# Patient Record
Sex: FEMALE | Race: WHITE | NOT HISPANIC OR LATINO | Employment: OTHER | URBAN - METROPOLITAN AREA
[De-identification: names, ages, dates, MRNs, and addresses within clinical notes are randomized per-mention and may not be internally consistent; named-entity substitution may affect disease eponyms.]

---

## 2022-08-16 ENCOUNTER — HOSPITAL ENCOUNTER (OUTPATIENT)
Dept: GENERAL RADIOLOGY | Facility: CLINIC | Age: 87
Discharge: HOME | End: 2022-08-16
Attending: INTERNAL MEDICINE
Payer: MEDICARE

## 2022-08-16 DIAGNOSIS — R91.8 OTHER NONSPECIFIC ABNORMAL FINDING OF LUNG FIELD: ICD-10-CM

## 2022-08-16 DIAGNOSIS — G47.34 IDIOPATHIC SLEEP RELATED NONOBSTRUCTIVE ALVEOLAR HYPOVENTILATION: ICD-10-CM

## 2022-08-16 PROCEDURE — 71046 X-RAY EXAM CHEST 2 VIEWS: CPT

## 2022-08-16 PROCEDURE — 76000 FLUOROSCOPY <1 HR PHYS/QHP: CPT

## 2023-05-17 ENCOUNTER — LAB REQUISITION (OUTPATIENT)
Dept: LAB | Facility: CLINIC | Age: 88
End: 2023-05-17
Payer: MEDICARE

## 2023-05-17 DIAGNOSIS — R35.0 FREQUENCY OF MICTURITION: ICD-10-CM

## 2023-05-17 LAB
ALBUMIN UR-MCNC: 20 MG/DL
APPEARANCE UR: ABNORMAL
BACTERIA #/AREA URNS HPF: ABNORMAL /HPF
BILIRUB UR QL STRIP: NEGATIVE
COLOR UR AUTO: YELLOW
GLUCOSE UR STRIP-MCNC: NEGATIVE MG/DL
HGB UR QL STRIP: ABNORMAL
KETONES UR STRIP-MCNC: NEGATIVE MG/DL
LEUKOCYTE ESTERASE UR QL STRIP: ABNORMAL
MUCOUS THREADS #/AREA URNS LPF: PRESENT /LPF
NITRATE UR QL: NEGATIVE
PH UR STRIP: 7 [PH] (ref 5–7)
RBC URINE: 10 /HPF
SP GR UR STRIP: 1.01 (ref 1–1.03)
SQUAMOUS EPITHELIAL: 2 /HPF
UROBILINOGEN UR STRIP-MCNC: NORMAL MG/DL
WBC CLUMPS #/AREA URNS HPF: PRESENT /HPF
WBC URINE: >182 /HPF

## 2023-05-17 PROCEDURE — 81001 URINALYSIS AUTO W/SCOPE: CPT | Mod: ORL

## 2023-05-17 PROCEDURE — 87086 URINE CULTURE/COLONY COUNT: CPT | Mod: ORL

## 2023-05-19 LAB — BACTERIA UR CULT: NORMAL

## 2023-08-16 ENCOUNTER — LAB REQUISITION (OUTPATIENT)
Dept: LAB | Facility: CLINIC | Age: 88
End: 2023-08-16
Payer: MEDICARE

## 2023-08-16 DIAGNOSIS — R35.0 FREQUENCY OF MICTURITION: ICD-10-CM

## 2023-08-16 LAB
ALBUMIN UR-MCNC: 30 MG/DL
APPEARANCE UR: ABNORMAL
BACTERIA #/AREA URNS HPF: ABNORMAL /HPF
BILIRUB UR QL STRIP: NEGATIVE
COLOR UR AUTO: YELLOW
GLUCOSE UR STRIP-MCNC: NEGATIVE MG/DL
HGB UR QL STRIP: ABNORMAL
HYALINE CASTS: 3 /LPF
KETONES UR STRIP-MCNC: NEGATIVE MG/DL
LEUKOCYTE ESTERASE UR QL STRIP: ABNORMAL
MUCOUS THREADS #/AREA URNS LPF: PRESENT /LPF
NITRATE UR QL: NEGATIVE
PH UR STRIP: 6.5 [PH] (ref 5–7)
RBC URINE: 8 /HPF
SP GR UR STRIP: 1.01 (ref 1–1.03)
SQUAMOUS EPITHELIAL: 2 /HPF
UROBILINOGEN UR STRIP-MCNC: NORMAL MG/DL
WBC CLUMPS #/AREA URNS HPF: PRESENT /HPF
WBC URINE: 80 /HPF

## 2023-08-16 PROCEDURE — 87186 SC STD MICRODIL/AGAR DIL: CPT | Mod: ORL | Performed by: INTERNAL MEDICINE

## 2023-08-16 PROCEDURE — 81001 URINALYSIS AUTO W/SCOPE: CPT | Mod: ORL | Performed by: INTERNAL MEDICINE

## 2023-08-17 LAB — BACTERIA UR CULT: ABNORMAL

## 2024-01-11 ENCOUNTER — LAB REQUISITION (OUTPATIENT)
Dept: LAB | Facility: CLINIC | Age: 89
End: 2024-01-11
Payer: MEDICARE

## 2024-01-11 DIAGNOSIS — Z87.440 PERSONAL HISTORY OF URINARY (TRACT) INFECTIONS: ICD-10-CM

## 2024-01-11 DIAGNOSIS — R41.0 DISORIENTATION, UNSPECIFIED: ICD-10-CM

## 2024-01-11 DIAGNOSIS — R53.0 NEOPLASTIC (MALIGNANT) RELATED FATIGUE: ICD-10-CM

## 2024-01-11 LAB
ALBUMIN UR-MCNC: 30 MG/DL
APPEARANCE UR: ABNORMAL
BACTERIA #/AREA URNS HPF: ABNORMAL /HPF
BILIRUB UR QL STRIP: NEGATIVE
COLOR UR AUTO: YELLOW
GLUCOSE UR STRIP-MCNC: NEGATIVE MG/DL
HGB UR QL STRIP: ABNORMAL
KETONES UR STRIP-MCNC: NEGATIVE MG/DL
LEUKOCYTE ESTERASE UR QL STRIP: ABNORMAL
MUCOUS THREADS #/AREA URNS LPF: PRESENT /LPF
NITRATE UR QL: NEGATIVE
PH UR STRIP: 7 [PH] (ref 5–7)
RBC URINE: 15 /HPF
SP GR UR STRIP: 1.01 (ref 1–1.03)
SQUAMOUS EPITHELIAL: 1 /HPF
TRANSITIONAL EPI: 1 /HPF
UROBILINOGEN UR STRIP-MCNC: NORMAL MG/DL
WBC CLUMPS #/AREA URNS HPF: PRESENT /HPF
WBC URINE: >182 /HPF

## 2024-01-11 PROCEDURE — 87086 URINE CULTURE/COLONY COUNT: CPT | Mod: ORL | Performed by: INTERNAL MEDICINE

## 2024-01-11 PROCEDURE — 81001 URINALYSIS AUTO W/SCOPE: CPT | Mod: ORL | Performed by: INTERNAL MEDICINE

## 2024-01-12 LAB — BACTERIA UR CULT: NORMAL

## 2024-03-05 ENCOUNTER — LAB REQUISITION (OUTPATIENT)
Dept: LAB | Facility: CLINIC | Age: 89
End: 2024-03-05
Payer: MEDICARE

## 2024-03-05 LAB
ALBUMIN UR-MCNC: 30 MG/DL
APPEARANCE UR: ABNORMAL
BACTERIA #/AREA URNS HPF: ABNORMAL /HPF
BILIRUB UR QL STRIP: NEGATIVE
COLOR UR AUTO: YELLOW
GLUCOSE UR STRIP-MCNC: NEGATIVE MG/DL
HGB UR QL STRIP: ABNORMAL
KETONES UR STRIP-MCNC: NEGATIVE MG/DL
LEUKOCYTE ESTERASE UR QL STRIP: ABNORMAL
MUCOUS THREADS #/AREA URNS LPF: PRESENT /LPF
NITRATE UR QL: NEGATIVE
PH UR STRIP: 5.5 [PH] (ref 5–7)
RBC URINE: 5 /HPF
SP GR UR STRIP: 1.01 (ref 1–1.03)
SQUAMOUS EPITHELIAL: 3 /HPF
UROBILINOGEN UR STRIP-MCNC: NORMAL MG/DL
WBC CLUMPS #/AREA URNS HPF: PRESENT /HPF
WBC URINE: >182 /HPF

## 2024-03-05 PROCEDURE — 81001 URINALYSIS AUTO W/SCOPE: CPT | Mod: ORL | Performed by: INTERNAL MEDICINE

## 2024-03-05 PROCEDURE — 87086 URINE CULTURE/COLONY COUNT: CPT | Mod: ORL | Performed by: INTERNAL MEDICINE

## 2024-03-05 PROCEDURE — 87186 SC STD MICRODIL/AGAR DIL: CPT | Mod: ORL | Performed by: INTERNAL MEDICINE

## 2024-03-06 LAB
BACTERIA UR CULT: ABNORMAL
BACTERIA UR CULT: ABNORMAL

## 2024-11-27 ENCOUNTER — LAB REQUISITION (OUTPATIENT)
Dept: LAB | Facility: CLINIC | Age: 89
End: 2024-11-27
Payer: MEDICARE

## 2024-11-27 DIAGNOSIS — Z87.440 PERSONAL HISTORY OF URINARY (TRACT) INFECTIONS: ICD-10-CM

## 2024-11-27 DIAGNOSIS — R35.0 FREQUENCY OF MICTURITION: ICD-10-CM

## 2024-11-27 DIAGNOSIS — R41.0 DISORIENTATION, UNSPECIFIED: ICD-10-CM

## 2024-11-27 LAB
ALBUMIN UR-MCNC: 10 MG/DL
APPEARANCE UR: ABNORMAL
BILIRUB UR QL STRIP: NEGATIVE
COLOR UR AUTO: YELLOW
GLUCOSE UR STRIP-MCNC: NEGATIVE MG/DL
HGB UR QL STRIP: ABNORMAL
KETONES UR STRIP-MCNC: NEGATIVE MG/DL
LEUKOCYTE ESTERASE UR QL STRIP: ABNORMAL
MUCOUS THREADS #/AREA URNS LPF: PRESENT /LPF
NITRATE UR QL: NEGATIVE
PH UR STRIP: 7 [PH] (ref 5–7)
RBC URINE: 2 /HPF
SP GR UR STRIP: 1.01 (ref 1–1.03)
SQUAMOUS EPITHELIAL: 5 /HPF
TRANSITIONAL EPI: 1 /HPF
UROBILINOGEN UR STRIP-MCNC: NORMAL MG/DL
WBC CLUMPS #/AREA URNS HPF: PRESENT /HPF
WBC URINE: >182 /HPF

## 2024-11-27 PROCEDURE — 87086 URINE CULTURE/COLONY COUNT: CPT | Mod: ORL | Performed by: INTERNAL MEDICINE

## 2024-11-27 PROCEDURE — 87186 SC STD MICRODIL/AGAR DIL: CPT | Mod: ORL | Performed by: INTERNAL MEDICINE

## 2024-11-27 PROCEDURE — 81001 URINALYSIS AUTO W/SCOPE: CPT | Mod: ORL | Performed by: INTERNAL MEDICINE

## 2024-11-28 LAB
BACTERIA UR CULT: ABNORMAL
BACTERIA UR CULT: ABNORMAL

## 2024-11-29 LAB
BACTERIA UR CULT: ABNORMAL
BACTERIA UR CULT: ABNORMAL

## 2025-03-27 ENCOUNTER — LAB REQUISITION (OUTPATIENT)
Dept: LAB | Facility: CLINIC | Age: 89
End: 2025-03-27
Payer: COMMERCIAL

## 2025-03-27 DIAGNOSIS — N39.0 URINARY TRACT INFECTION, SITE NOT SPECIFIED: ICD-10-CM

## 2025-03-27 LAB
ALBUMIN UR-MCNC: 20 MG/DL
AMORPH CRY #/AREA URNS HPF: ABNORMAL /HPF
APPEARANCE UR: ABNORMAL
BACTERIA #/AREA URNS HPF: ABNORMAL /HPF
BILIRUB UR QL STRIP: NEGATIVE
COLOR UR AUTO: ABNORMAL
GLUCOSE UR STRIP-MCNC: NEGATIVE MG/DL
HGB UR QL STRIP: ABNORMAL
KETONES UR STRIP-MCNC: NEGATIVE MG/DL
LEUKOCYTE ESTERASE UR QL STRIP: ABNORMAL
MUCOUS THREADS #/AREA URNS LPF: PRESENT /LPF
NITRATE UR QL: NEGATIVE
PH UR STRIP: 7.5 [PH] (ref 5–7)
RBC URINE: 1 /HPF
SP GR UR STRIP: 1.01 (ref 1–1.03)
SQUAMOUS EPITHELIAL: 3 /HPF
TRANSITIONAL EPI: <1 /HPF
UROBILINOGEN UR STRIP-MCNC: NORMAL MG/DL
WBC CLUMPS #/AREA URNS HPF: PRESENT /HPF
WBC URINE: 101 /HPF

## 2025-03-27 PROCEDURE — 87086 URINE CULTURE/COLONY COUNT: CPT | Mod: ORL | Performed by: INTERNAL MEDICINE

## 2025-03-27 PROCEDURE — 87186 SC STD MICRODIL/AGAR DIL: CPT | Mod: ORL | Performed by: INTERNAL MEDICINE

## 2025-03-27 PROCEDURE — 81001 URINALYSIS AUTO W/SCOPE: CPT | Mod: ORL | Performed by: INTERNAL MEDICINE

## 2025-03-28 LAB — BACTERIA UR CULT: ABNORMAL

## 2025-04-11 ENCOUNTER — LAB REQUISITION (OUTPATIENT)
Dept: LAB | Facility: CLINIC | Age: 89
End: 2025-04-11
Payer: MEDICARE

## 2025-04-11 DIAGNOSIS — R35.0 FREQUENCY OF MICTURITION: ICD-10-CM

## 2025-04-11 LAB
ALBUMIN UR-MCNC: NEGATIVE MG/DL
APPEARANCE UR: ABNORMAL
BACTERIA #/AREA URNS HPF: ABNORMAL /HPF
BILIRUB UR QL STRIP: NEGATIVE
COLOR UR AUTO: YELLOW
GLUCOSE UR STRIP-MCNC: NEGATIVE MG/DL
HGB UR QL STRIP: ABNORMAL
KETONES UR STRIP-MCNC: NEGATIVE MG/DL
LEUKOCYTE ESTERASE UR QL STRIP: ABNORMAL
MUCOUS THREADS #/AREA URNS LPF: PRESENT /LPF
NITRATE UR QL: POSITIVE
PH UR STRIP: 7.5 [PH] (ref 5–7)
RBC URINE: 3 /HPF
SP GR UR STRIP: 1.01 (ref 1–1.03)
SQUAMOUS EPITHELIAL: 2 /HPF
TRANSITIONAL EPI: <1 /HPF
UROBILINOGEN UR STRIP-MCNC: NORMAL MG/DL
WBC URINE: >182 /HPF

## 2025-04-11 PROCEDURE — 87186 SC STD MICRODIL/AGAR DIL: CPT | Mod: ORL | Performed by: INTERNAL MEDICINE

## 2025-04-11 PROCEDURE — 81001 URINALYSIS AUTO W/SCOPE: CPT | Mod: ORL | Performed by: INTERNAL MEDICINE

## 2025-04-13 LAB — BACTERIA UR CULT: ABNORMAL

## 2025-05-09 ENCOUNTER — HOSPITAL ENCOUNTER (EMERGENCY)
Facility: CLINIC | Age: 89
Discharge: HOME | End: 2025-05-09
Payer: MEDICARE

## 2025-05-09 ENCOUNTER — APPOINTMENT (OUTPATIENT)
Dept: ULTRASOUND IMAGING | Facility: CLINIC | Age: 89
End: 2025-05-09
Payer: MEDICARE

## 2025-05-09 VITALS
OXYGEN SATURATION: 98 % | DIASTOLIC BLOOD PRESSURE: 72 MMHG | WEIGHT: 140 LBS | HEART RATE: 87 BPM | TEMPERATURE: 98.7 F | SYSTOLIC BLOOD PRESSURE: 116 MMHG | RESPIRATION RATE: 18 BRPM

## 2025-05-09 DIAGNOSIS — M71.21 BAKER'S CYST OF KNEE, RIGHT: ICD-10-CM

## 2025-05-09 LAB
ANION GAP SERPL CALCULATED.3IONS-SCNC: 9 MMOL/L (ref 7–15)
BASOPHILS # BLD AUTO: 0 10E3/UL (ref 0–0.2)
BASOPHILS NFR BLD AUTO: 1 %
BUN SERPL-MCNC: 12.1 MG/DL (ref 8–23)
CALCIUM SERPL-MCNC: 9.2 MG/DL (ref 8.8–10.4)
CHLORIDE SERPL-SCNC: 97 MMOL/L (ref 98–107)
CREAT SERPL-MCNC: 0.73 MG/DL (ref 0.51–0.95)
EGFRCR SERPLBLD CKD-EPI 2021: 79 ML/MIN/1.73M2
EOSINOPHIL # BLD AUTO: 0.1 10E3/UL (ref 0–0.7)
EOSINOPHIL NFR BLD AUTO: 2 %
ERYTHROCYTE [DISTWIDTH] IN BLOOD BY AUTOMATED COUNT: 14.4 % (ref 10–15)
GLUCOSE SERPL-MCNC: 102 MG/DL (ref 70–99)
HCO3 SERPL-SCNC: 34 MMOL/L (ref 22–29)
HCT VFR BLD AUTO: 34.8 % (ref 35–47)
HGB BLD-MCNC: 11 G/DL (ref 11.7–15.7)
HOLD SPECIMEN: NORMAL
HOLD SPECIMEN: NORMAL
IMM GRANULOCYTES # BLD: 0.1 10E3/UL
IMM GRANULOCYTES NFR BLD: 1 %
LYMPHOCYTES # BLD AUTO: 1 10E3/UL (ref 0.8–5.3)
LYMPHOCYTES NFR BLD AUTO: 18 %
MCH RBC QN AUTO: 26.4 PG (ref 26.5–33)
MCHC RBC AUTO-ENTMCNC: 31.6 G/DL (ref 31.5–36.5)
MCV RBC AUTO: 84 FL (ref 78–100)
MONOCYTES # BLD AUTO: 0.5 10E3/UL (ref 0–1.3)
MONOCYTES NFR BLD AUTO: 8 %
NEUTROPHILS # BLD AUTO: 4.2 10E3/UL (ref 1.6–8.3)
NEUTROPHILS NFR BLD AUTO: 71 %
NRBC # BLD AUTO: 0 10E3/UL
NRBC BLD AUTO-RTO: 0 /100
PLATELET # BLD AUTO: 328 10E3/UL (ref 150–450)
POTASSIUM SERPL-SCNC: 3.7 MMOL/L (ref 3.4–5.3)
RBC # BLD AUTO: 4.16 10E6/UL (ref 3.8–5.2)
SODIUM SERPL-SCNC: 140 MMOL/L (ref 135–145)
WBC # BLD AUTO: 6 10E3/UL (ref 4–11)

## 2025-05-09 PROCEDURE — 80048 BASIC METABOLIC PNL TOTAL CA: CPT

## 2025-05-09 PROCEDURE — 85025 COMPLETE CBC W/AUTO DIFF WBC: CPT | Performed by: EMERGENCY MEDICINE

## 2025-05-09 PROCEDURE — 36415 COLL VENOUS BLD VENIPUNCTURE: CPT | Performed by: EMERGENCY MEDICINE

## 2025-05-09 PROCEDURE — 99284 EMERGENCY DEPT VISIT MOD MDM: CPT | Mod: 25

## 2025-05-09 PROCEDURE — 93971 EXTREMITY STUDY: CPT | Mod: RT

## 2025-05-09 PROCEDURE — 82565 ASSAY OF CREATININE: CPT | Performed by: EMERGENCY MEDICINE

## 2025-05-09 PROCEDURE — 85025 COMPLETE CBC W/AUTO DIFF WBC: CPT

## 2025-05-09 ASSESSMENT — ACTIVITIES OF DAILY LIVING (ADL)
ADLS_ACUITY_SCORE: 41

## 2025-05-09 ASSESSMENT — COLUMBIA-SUICIDE SEVERITY RATING SCALE - C-SSRS
6. HAVE YOU EVER DONE ANYTHING, STARTED TO DO ANYTHING, OR PREPARED TO DO ANYTHING TO END YOUR LIFE?: NO
1. IN THE PAST MONTH, HAVE YOU WISHED YOU WERE DEAD OR WISHED YOU COULD GO TO SLEEP AND NOT WAKE UP?: NO
2. HAVE YOU ACTUALLY HAD ANY THOUGHTS OF KILLING YOURSELF IN THE PAST MONTH?: NO

## 2025-05-09 NOTE — DISCHARGE INSTRUCTIONS
Keep right leg elevated    Use ACE wrap to right knee to help with compressing fluid    Ice the back of the right knee to help with pain, you are likely having pain from this, causing over-compensation to other muscles in the right calf and leg    Return for worsening swelling, inability to ambulate, chest pain or difficulty breathing

## 2025-05-09 NOTE — ED PROVIDER NOTES
History     Chief Complaint:  Leg Swelling       HPI   Marta Tavarez is a 89 year old female with a past medical history notable for hypertension, paroxysmal atrial fibrillation and lower extremity edema presenting today for evaluation of right calf pain and swelling, increased from baseline.  She is here with her friend who provides vast majority of the history.  Over the last 1.5 weeks, she has had a heaviness in the left lower extremity that has been progressively worsening to the point where she is dragging the right leg behind her.  The last 1 week, she cannot walk at her baseline.  Historically, her friend states that her left leg has been a problem, but over the last 1 week, the right leg has been so.  There has not been any known injury.  She denies any numbness or tingling.  When sitting, she reports little discomfort, but has a lot of pain with any ambulation.  No pain in the hip or low back.  She does not take blood thinners.    Independent Historian:    Friend, they report the above    Review of External Notes:  Office visit from 12/18/24 which outlines patient's chronic medical problems managed through the [facility] system with    Medications:    No current outpatient medications on file.      Past Medical History:    No past medical history on file.    Past Surgical History:    No past surgical history on file.       Physical Exam     Patient Vitals for the past 24 hrs:   BP Temp Temp src Pulse Resp SpO2 Weight   05/09/25 1108 -- -- -- -- -- 98 % --   05/09/25 1106 116/72 98.7  F (37.1  C) Oral 87 18 -- 63.5 kg (140 lb)        Physical Exam  /72   Pulse 87   Temp 98.7  F (37.1  C) (Oral)   Resp 18   Wt 63.5 kg (140 lb)   SpO2 98%    General: No acute distress. Accompanied by friend.  Head: Atraumatic.   EENT: Moist mucus membranes.   CV: Regular rate and rhythm.   Respiratory: Breathing comfortably on room air. Lungs clear to auscultation bilaterally without wheezes, rhonchi, or  rales.  GI: Soft, non-distended. Non-tender abdomen. No rebound, rigidity, or guarding.   Msk: Bilateral 1+ pitting edema with lower extremities in compression stockings.  There is tenderness with palpation of the medial aspect of the right calf without any overlying erythema.  There is no bony tenderness to palpation.  2+ DP and PT pulse on the right.  Skin: Warm and dry. No rashes.  Neuro: Awake, alert, and conversant. No focal neurologic deficits.       Emergency Department Course   Imaging:  US Lower Extremity Venous Duplex Right   Final Result   IMPRESSION:   1.  No deep venous thrombosis in the right lower extremity.   2.  Complex probable right Baker's cyst.        Laboratory:  Labs Ordered and Resulted from Time of ED Arrival to Time of ED Departure   BASIC METABOLIC PANEL - Abnormal       Result Value    Sodium 140      Potassium 3.7      Chloride 97 (*)     Carbon Dioxide (CO2) 34 (*)     Anion Gap 9      Urea Nitrogen 12.1      Creatinine 0.73      GFR Estimate 79      Calcium 9.2      Glucose 102 (*)    CBC WITH PLATELETS AND DIFFERENTIAL - Abnormal    WBC Count 6.0      RBC Count 4.16      Hemoglobin 11.0 (*)     Hematocrit 34.8 (*)     MCV 84      MCH 26.4 (*)     MCHC 31.6      RDW 14.4      Platelet Count 328      % Neutrophils 71      % Lymphocytes 18      % Monocytes 8      % Eosinophils 2      % Basophils 1      % Immature Granulocytes 1      NRBCs per 100 WBC 0      Absolute Neutrophils 4.2      Absolute Lymphocytes 1.0      Absolute Monocytes 0.5      Absolute Eosinophils 0.1      Absolute Basophils 0.0      Absolute Immature Granulocytes 0.1      Absolute NRBCs 0.0        Emergency Department Course & Assessments:  Interventions:  Medications - No data to display     Assessments:  Performed history and exam.    Independent Interpretation (X-rays, CTs, rhythm strip):  None    Consultations/Discussion of Management or Tests:  None    Social Drivers of Health affecting care:  Memory difficulties      Disposition:  The patient was discharged.    Impression & Plan    CMS Diagnoses: None       Medical Decision Makin year old-year-old female with [right/left calf pain/swelling] with history and exam consistent with low risk for deep vein thrombosis and likely [muscle strain].    Initial consideration in this patient included deep vein thrombosis (DVT), muscle strain, edema, cellulitis, arterial occlusions, baker's cyst, and pulmonary embolism (PE) among others.    Patient presented with complaint of right calf pain and swelling.  Formal] ultrasound obtained of the right with no evidence of clot and completely compressible vessels at visulaized portions of the common femoral, greater saphenous, superficial femoral, deep femoral, or popliteal veins.  We discussed limitations of bedside ultrasound evaluation, specifically limited ability to visualize the smaller vessels of the calf.  Patient felt to be low risk at this time with no indication for initiation of anticoagulation.  Discussed need for follow up within 1-2 weeks for repeat evaluation with ultrasound for persistent symptoms, and the patient demonstrated understanding and agreement.  No report of chest pain, shortness of breath, or other symptoms or findings suggestive of PE at this time. She was noted to have a complex Baker's cyst of the right calf, explaining the pain she has been experiencing, recommended compression, elevation and follow up with Orthopedics.     Prior to discharge, we discussed return precautions, specifically for evidence of chest pain or shortness of breath suggestive of PE.  There is nothing at this time to suggest compartment syndrome.  Will return for significant worsening of pain, swelling or bruising.      Diagnosis:    ICD-10-CM    1. Baker's cyst of knee, right  M71.21         Mine Arcos PA-C  2025   Mine Arcos PA-C Romano, Amanda, PA-C  [phone]

## 2025-05-23 ENCOUNTER — HOSPITAL ENCOUNTER (EMERGENCY)
Facility: CLINIC | Age: 89
Discharge: HOME | End: 2025-05-23
Attending: EMERGENCY MEDICINE | Admitting: EMERGENCY MEDICINE
Payer: MEDICARE

## 2025-05-23 VITALS
RESPIRATION RATE: 20 BRPM | DIASTOLIC BLOOD PRESSURE: 77 MMHG | OXYGEN SATURATION: 99 % | TEMPERATURE: 97.7 F | HEART RATE: 96 BPM | SYSTOLIC BLOOD PRESSURE: 128 MMHG

## 2025-05-23 DIAGNOSIS — N39.0 ACUTE UTI: ICD-10-CM

## 2025-05-23 DIAGNOSIS — K64.4 EXTERNAL HEMORRHOIDS WITH COMPLICATION: ICD-10-CM

## 2025-05-23 LAB
ALBUMIN UR-MCNC: NEGATIVE MG/DL
APPEARANCE UR: CLEAR
BACTERIA #/AREA URNS HPF: ABNORMAL /HPF
BILIRUB UR QL STRIP: NEGATIVE
COLOR UR AUTO: ABNORMAL
GLUCOSE UR STRIP-MCNC: NEGATIVE MG/DL
HGB UR QL STRIP: NEGATIVE
KETONES UR STRIP-MCNC: NEGATIVE MG/DL
LEUKOCYTE ESTERASE UR QL STRIP: ABNORMAL
MUCOUS THREADS #/AREA URNS LPF: PRESENT /LPF
NITRATE UR QL: NEGATIVE
PH UR STRIP: 8 [PH] (ref 5–7)
RBC URINE: 2 /HPF
SP GR UR STRIP: 1.01 (ref 1–1.03)
SQUAMOUS EPITHELIAL: 1 /HPF
UROBILINOGEN UR STRIP-MCNC: NORMAL MG/DL
WBC URINE: 23 /HPF

## 2025-05-23 PROCEDURE — 99283 EMERGENCY DEPT VISIT LOW MDM: CPT

## 2025-05-23 PROCEDURE — 87086 URINE CULTURE/COLONY COUNT: CPT | Performed by: EMERGENCY MEDICINE

## 2025-05-23 PROCEDURE — 81001 URINALYSIS AUTO W/SCOPE: CPT | Performed by: EMERGENCY MEDICINE

## 2025-05-23 PROCEDURE — 250N000013 HC RX MED GY IP 250 OP 250 PS 637: Performed by: EMERGENCY MEDICINE

## 2025-05-23 RX ORDER — CEPHALEXIN 500 MG/1
500 CAPSULE ORAL ONCE
Status: COMPLETED | OUTPATIENT
Start: 2025-05-23 | End: 2025-05-23

## 2025-05-23 RX ORDER — CEPHALEXIN 500 MG/1
500 CAPSULE ORAL 2 TIMES DAILY
Qty: 10 CAPSULE | Refills: 0 | Status: SHIPPED | OUTPATIENT
Start: 2025-05-23 | End: 2025-05-28

## 2025-05-23 RX ADMIN — CEPHALEXIN 500 MG: 500 CAPSULE ORAL at 17:51

## 2025-05-23 ASSESSMENT — ACTIVITIES OF DAILY LIVING (ADL)
ADLS_ACUITY_SCORE: 41

## 2025-05-23 NOTE — ED NOTES
"Writer assisted patient to the bedside commode.    Patient stated her friend will not be coming to pick her up to take her back home, stated, \"Kayla told me I have to go back home the way I got here\". Informed patient we will make sure that she has a ride to go back home, patient verbalized understanding, stated, \"I just want to go home\".   "

## 2025-05-23 NOTE — ED NOTES
After Visit Summary was reviewed with patient and friend, Kayla. Patient verbalized understanding of instructions, was recommended follow up and was given an opportunity to ask questions. Patient appears stable, ERT assisted with discharge/exit with wheelchair. Patient's friend, Kayla, will transport patient back to facility, facility aware. See note.

## 2025-05-23 NOTE — ED PROVIDER NOTES
Emergency Department Note      History of Present Illness     Chief Complaint   Hemorrhoids      HPI   Marta Tavarez is a 89 year old female with history of atrial fibrillation, CHF, and hypertension amongst others who presents to the ED for evaluation of hemorrhoids. Patient reports that she has hemorrhoids, and is usually able to push them back in, but was unable to reduce them on her own today. She state that she is currently in no pain. Patient denies any present bleeding. She adds that she has had hemorrhoids for a while now, and has never had them removed. Patient presents to the ED from [facility].  Independent Historian   None    Review of External Notes   none    Past Medical History     Medical History and Problem List   Centrilobular emphysema  Arthritis    Kyphoscoliosis   Edema  Chronic diastolic heart failure   Peptic ulcer disease   Diverticulitis of large intestine   Paroxysmal atrial fibrillation  Atrial tachycardia   Pancreatic cyst   Anemia   Osteoarthritis  Hypotension   Hyperlipidemia   Hypoxia   Hepatitis A   Hepatitis B   Lordoscoliosis  Incidental durotomy   Kyphoscoliosis  Cauda equina syndrome   Tachycardia   Thrombocytopenia   Gastric ulcer   Scoliosis  Lumbago   Vertigo   Gastrointestinal hemorrhage    Esophogeal reflux   Hypertension  Neurogenic bladder  Choledocholithiasis   Melena   Acute respiratory failure   Symptomatic menopausal state   Myalgia and myositis     Medications   Trelegy Ellipta inhaler   Bumex   Cardizem   Neurotonin   Diltiazem   Imodium     Surgical History   Cervical polypectomy   Arthroscopy bilateral   Hernia repair   Tonsil and adenoidectomy   Dilation and curettage - PMB, endocervical polyp   Stomach biopsy   Esophagogastroduodenoscopy   Arthroscopy  Anterior, posterior t-2to sacrum spinal surgery   IM kemal left hip   Arthroplasty right knee   Ercp w/ sphincterotomy and balloon dilation   Laparoscopic cholecystomy      Physical Exam     Patient Vitals for the  past 24 hrs:   BP Temp Temp src Pulse Resp SpO2   05/23/25 1640 -- -- -- -- -- 93 %   05/23/25 1603 -- -- -- -- -- 92 %   05/23/25 1545 97/58 97.7  F (36.5  C) Oral 77 20 --     Physical Exam  Constitutional:       Appearance: She is well-developed.   Cardiovascular:      Rate and Rhythm: Normal rate and regular rhythm.      Heart sounds: Normal heart sounds. No murmur heard.     No friction rub. No gallop.   Pulmonary:      Effort: Pulmonary effort is normal. No respiratory distress.      Breath sounds: Normal breath sounds. No wheezing or rales.   Abdominal:      General: Bowel sounds are normal. There is no distension.      Palpations: Abdomen is soft. There is no mass.      Tenderness: There is no abdominal tenderness.   Genitourinary:     Comments: 3 nontender nonthrombosed external hemorrhoids without any signs of bleeding seen.  No thrombosed external hemorrhoids on exam.  Musculoskeletal:      Right lower leg: No edema.      Left lower leg: No edema.   Skin:     General: Skin is warm and dry.      Capillary Refill: Capillary refill takes less than 2 seconds.      Findings: No rash.   Neurological:      Mental Status: She is alert.           Diagnostics     Lab Results   None     Imaging   None     Independent Interpretation   None    ED Course      Medications Administered   Medications - No data to display    Procedures   Procedures     Discussion of Management   None    ED Course   ED Course as of 05/23/25 1700   Fri May 23, 2025   1602 I obtained history and examined the patient as noted above.        Additional Documentation  None    Medical Decision Making / Diagnosis     CMS Diagnoses: None    MIPS   None           [facility]   Marta Tavarez is a 89 year old female who presents for evaluation of nonthrombosed external hemorrhoids.  On evaluation, there are 3 hemorrhoids that are same but they are no signs of bleeding or irritation.  They are nonthrombosed.  They are not tender on my exam.  I discussed with  the patient that they will prolapse even if we try to push them in.  We discussed symptoms to look for such as bleeding or severe pain or difficulty having a bowel movement.  There is nothing for us to do here in the ER.  She is referred to colorectal surgery to have them removed if she wants to have them done.  Referral has been placed in [facility] as well.  Initially patient wanted to have her friend take her back.  Unfortunately her friend is unable to do that as we had to send her back by wheelchair van.  Her friend will help her obtain colorectal surgery clinic appointments.  Discharge instructions given to her friend as well.  Antibiotic sent to her pharmacy for her urinary infection.  First dose antibiotic given here today.    Disposition   The patient was discharged.     Diagnosis     ICD-10-CM    1. External hemorrhoids with complication  K64.4 Adult Colorectal Surgery [facility] Referral - [facility] ([facility]      2. Acute UTI  N39.0              Scribe Disclosure:  Yifan LOAIZA, am serving as a scribe at 4:08 PM on 5/23/2025 to document services personally performed by Suman Domingo MD based on my observations and the provider's statements to me.        Suman Domingo MD  05/23/25 2030

## 2025-05-23 NOTE — DISCHARGE INSTRUCTIONS
Please follow up with colo-rectal surgery to get your hemorrhoids removed  Keep stool soft to avoid irritating the hemorrhoids  Avoid straining and constipation  Next dose of antibiotic tomorrow

## 2025-05-23 NOTE — ED NOTES
Bed: ED39  Expected date: 5/23/25  Expected time: 3:24 PM  Means of arrival:   Comments:  [id number]

## 2025-05-23 NOTE — ED NOTES
Writer was informed that transportation back to facility may take 2-3 hours. Writer spoke with friend, Kayla, and she stated she can take patient back home, as long as someone can assist in getting her into her car and out.     Writer called nurse, Sil, at the facility and advised that patient's friend, Kayla, will be able to take patient home, as long as there is assistance with getting patient in and out of the car. Sil stated she will let her staff know to keep an eye out.

## 2025-05-23 NOTE — ED NOTES
Report provided to nurse on site, Sil, at [phone]. Sil verbalized understanding, stated she will remain on site until 2100 tonight, keys not needed, patient resides on the first floor.     Friend Kayla, is at bedside, stated she is unable to transport patient home as she has a bad back.

## 2025-05-23 NOTE — ED NOTES
Called [facility] facility at [phone], no success, after hours to call [phone], spoke with Tad and was advised to call nurse, Mesha, at [phone].

## 2025-05-23 NOTE — ED TRIAGE NOTES
"BIBA from [facility] in [place]. Per EMS, patient has 3 large hemorrhoids that she has been \"pushing back in\", Patient stated \"I have no issues with them, they just keep sticking out, currently denies an pain.\"  Patient has allergies to lisinopril and ibuprofen. PMH of scoliosis with neuropathy of left side, chronic willie LE edema- has compression stockings on.  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 A&Ox4. VSS on RA.      Triage Assessment (Adult)       Row Name 05/23/25 1547          Triage Assessment    Airway WDL WDL        Respiratory WDL    Respiratory WDL WDL        Cardiac WDL    Cardiac WDL WDL                     "

## 2025-05-23 NOTE — ED NOTES
Patient asked when her friend, Kayla, is coming, requested to speak with her, stated she did not bring her purse and does not have her phone number. Writer provided a phone and her friend's phone number.     Patient is currently speaking with her friend, Kayla.

## 2025-05-24 LAB — BACTERIA UR CULT: ABNORMAL

## 2025-05-25 ENCOUNTER — TELEPHONE (OUTPATIENT)
Dept: NURSING | Facility: CLINIC | Age: 89
End: 2025-05-25
Payer: MEDICARE

## 2025-05-25 NOTE — LETTER
May 25, 2025        Marta Tavarez  [address] [address] [place] [place] [address]          Dear Marta Tavarez:    You were seen in the [facility] Emergency Department at [facility] 5/23/2025.  We are unable to reach you by phone, so we are sending you this letter.     It is important that you call [facility] Emergency Department lab result nurse at [phone], as we have information to relay to you AND/OR we MAY have to make some changes in your treatment.    Best time to call back is between 9AM and 5:30PM, 7 days a week.      Sincerely,     [facility] Emergency Department Lab Result RN  [phone]

## 2025-05-25 NOTE — TELEPHONE ENCOUNTER
[facility]    Reason for call: Lab Result Notification     Lab Result (including Rx patient on, if applicable).  If culture, copy of lab report at bottom.  Lab Result: Urine Culture - See Below    ED Rx: cephALEXin (KEFLEX) 500 MG capsule - Take 1 capsule (500 mg) by mouth 2 times daily for 5 days   10,000-50,000 CFU/mL Aerococcus urinae Abnormal  (SUSCEPTIBLE)    Creatinine Level (mg/dl)   Creatinine   Date Value Ref Range Status   05/09/2025 0.73 0.51 - 0.95 mg/dL Final    Creatinine clearance (ml/min), if applicable    Creatinine clearance cannot be calculated (Unknown ideal weight.)     ED Symptoms: Presented to the ED for the evaulation of hemorrhoids. Positive UA.     Current Symptoms: Unable to assess.     RN Recommendations/Instructions per [facility] ED lab result protocol:   [facility] ED lab result protocol utilized: Urine Culture  Continue antibiotic/medication as prescribed: Cephalexin    Unable to reach patient/caregiver. Unable to leave a message.     Letter pended to be sent via USPS mail.       EDILBERTO JEREZ RN

## 2025-05-26 NOTE — TELEPHONE ENCOUNTER
5/26/253:41 pm 2nd Attempt  Per friend, pt resides at the [facility] [phone] with message to call [phone] which there was no answer, or call [phone] which there was no answer or ability to leave .

## 2025-05-29 ENCOUNTER — DOCUMENTATION ONLY (OUTPATIENT)
Dept: OTHER | Facility: CLINIC | Age: 89
End: 2025-05-29
Payer: MEDICARE

## 2025-06-02 ENCOUNTER — DOCUMENTATION ONLY (OUTPATIENT)
Dept: OTHER | Facility: CLINIC | Age: 89
End: 2025-06-02
Payer: MEDICARE

## 2025-06-03 ENCOUNTER — ASSISTED LIVING VISIT (OUTPATIENT)
Dept: GERIATRICS | Facility: CLINIC | Age: 89
End: 2025-06-03
Payer: MEDICARE

## 2025-06-03 ENCOUNTER — DOCUMENTATION ONLY (OUTPATIENT)
Dept: GERIATRICS | Facility: CLINIC | Age: 89
End: 2025-06-03

## 2025-06-03 VITALS
HEART RATE: 77 BPM | RESPIRATION RATE: 16 BRPM | SYSTOLIC BLOOD PRESSURE: 110 MMHG | DIASTOLIC BLOOD PRESSURE: 59 MMHG | WEIGHT: 141 LBS | OXYGEN SATURATION: 94 %

## 2025-06-03 DIAGNOSIS — Z71.89 ACP (ADVANCE CARE PLANNING): ICD-10-CM

## 2025-06-03 DIAGNOSIS — I50.32 CHRONIC DIASTOLIC HEART FAILURE (H): Primary | ICD-10-CM

## 2025-06-03 DIAGNOSIS — G60.9 IDIOPATHIC PERIPHERAL NEUROPATHY: ICD-10-CM

## 2025-06-03 DIAGNOSIS — J43.9 PULMONARY EMPHYSEMA, UNSPECIFIED EMPHYSEMA TYPE (H): ICD-10-CM

## 2025-06-03 DIAGNOSIS — I10 HYPERTENSION, UNSPECIFIED TYPE: ICD-10-CM

## 2025-06-03 DIAGNOSIS — K64.4 EXTERNAL HEMORRHOIDS: ICD-10-CM

## 2025-06-03 PROCEDURE — 99344 HOME/RES VST NEW MOD MDM 60: CPT | Performed by: NURSE PRACTITIONER

## 2025-06-03 RX ORDER — GABAPENTIN 100 MG/1
100 CAPSULE ORAL AT BEDTIME
COMMUNITY

## 2025-06-03 RX ORDER — DILTIAZEM HYDROCHLORIDE 180 MG/1
180 CAPSULE, EXTENDED RELEASE ORAL DAILY
COMMUNITY

## 2025-06-03 RX ORDER — THERA TABS 400 MCG
1 TAB ORAL DAILY
COMMUNITY

## 2025-06-03 RX ORDER — LOPERAMIDE HYDROCHLORIDE 2 MG/1
4 CAPSULE ORAL PRN
COMMUNITY

## 2025-06-03 RX ORDER — BUMETANIDE 1 MG/1
1 TABLET ORAL EVERY MORNING
COMMUNITY

## 2025-06-03 NOTE — LETTER
6/3/2025      Marta Tavarez  [address]  [place] [address]        [facility]  PRIMARY CARE PROVIDER AND CLINIC:  Howard Tamayo, APRN CNP, [address] / [place] [address]  Chief Complaint   Patient presents with     [facility] Medical Record Number:  [id number]  Place of Service where encounter took place:  [facility] [[id number]]    Marta Tavarez  is a 89 year old  (1935), admitted to the above facility from  [facility]. Hospital stay 5/23/25 through 5/23/25..  Admitted to this facility for  rehab, medical management, and nursing care.    HPI:    HPI information obtained from: facility chart records, facility staff, patient report, and [facility] chart review.   Brief Summary of Hospital Course:     HF: LE edema. Resp. Status gen. Stable uses O2 at hs for sleep related hypoventilation. Wt stable. Ongoing LE edema. 5/9/25 ED visit for LE edema R calf pain. US neg. Did show R baker's cyct. R LE pain resolved. Wears compression stockings. Cont. On bumex. Has increased dose at times for increase in LE edema.     HTN. Cont on cardizem. BP stable. No reports of dizziness     Emphysema: O2 sats stable. No recent wheezing or cough. No reports of CAIN. Cont. On trelegy.    Peripheral neuropathy. H/o LLE pain, denies pain today. Taking hs gabapentin. Has some gen. LE weakness. Favors LLE with amb. Slowed gait. Working with PT.     Hemorrhoids, external. More recently not able to reduce. No active bleed. ED visit 5/25/25 for eval. Given referral for colorectal surg.       Updates on Status Since Skilled nursing Admission: mood gen. Stable. Completed keflex course for UTI    CODE STATUS/ADVANCE DIRECTIVES DISCUSSION:   DNR / DNI  Patient's living condition: lives in an assisted living facility  ALLERGIES: Atorvastatin and Ibuprofen  PAST MEDICAL HISTORY:  has a past medical history of Bilateral  leg edema, COPD (chronic obstructive pulmonary disease) (H), Cyst of pancreas, Gastroesophageal reflux disease without esophagitis, Hemorrhoids, unspecified hemorrhoid type, HTN (hypertension), Idiopathic sleep-related hypoventilation, Neuropathy, and Osteoarthritis.  PAST SURGICAL HISTORY:   has a past surgical history that includes hernia repair; joint replacement; Ercp W/ Sphincterotomy And Balloon Dilation (N/A); and Cholecystectomy (N/A).  FAMILY HISTORY: family history is not on file.  SOCIAL HISTORY:       Post Discharge Medication Reconciliation Status: discharge medications reconciled, continue medications without change    Current Outpatient Medications   Medication Sig Dispense Refill     bumetanide (BUMEX) 1 MG tablet Take 1 mg by mouth every morning.       calcium citrate-vitamin D (CITRACAL) 200-6.25 MG-MCG TABS per tablet Take 1 tablet by mouth daily.       diltiazem ER (DILT-XR) 180 MG 24 hr capsule Take 180 mg by mouth daily.       Fluticasone-Umeclidin-Vilant (TRELEGY ELLIPTA) 100-62.5-25 MCG/ACT oral inhaler Inhale 1 puff into the lungs daily.       gabapentin (NEURONTIN) 100 MG capsule Take 100 mg by mouth at bedtime.       loperamide (IMODIUM) 2 MG capsule Take 4 mg by mouth as needed for diarrhea.       multivitamin, therapeutic (THERA-VIT) TABS tablet Take 1 tablet by mouth daily.       polyethylene glycol-propylene glycol (SYSTANE ULTRA) 0.4-0.3 % SOLN ophthalmic solution Place 1 drop into both eyes every 2 hours as needed for dry eyes.           ROS:  No chest pain, shortness of breath, fevers, chills, headache, nausea, vomiting, dysuria or bowel abnormalities.  Appetite is  normal.  No pain except occ LEs.    Vitals:  /59   Pulse 77   Resp 16   Wt 64 kg (141 lb)   SpO2 94%   Exam:  GENERAL APPEARANCE:  Alert, in no distress, cooperative  ENT:  Mouth and posterior oropharynx normal, moist mucous membranes, [place], cerumen bilat ear canals  EYES:  EOM, conjunctivae, lids, pupils and  irises normal, PERRL, no drainage  NECK:  No adenopathy,masses or thyromegaly, FROM, no carotid bruit  RESP:  respiratory effort and palpation of chest normal, lungs clear to auscultation , no respiratory distress, diminished breath sounds bibasilar  CV:  Palpation and auscultation of heart done , regular rate and rhythm, no murmur, rub, or gallop, peripheral edema 2-3+ in LEs  ABDOMEN:  normal bowel sounds, soft, nontender, no hepatosplenomegaly or other masses, no guarding or rebound, no bruits  M/S:   muscle strength 5/5 all 4 ext. Some gen LE weakness L>R. Stands indep. Gait steady wth walker, sl favors LLE  SKIN:  external hemorrhoid x 3, no active bleed  NEURO:   Cranial nerves 2-12 are normal tested and grossly at patient's baseline, speech fluid, no tremor  PSYCH:  memory impaired , affect and mood normal, no apparent anxiety    Lab/Diagnostic data:  Most Recent 3 CBC's:  Recent Labs   Lab Test 05/09/25  1210   WBC 6.0   HGB 11.0*   MCV 84        Most Recent 3 BMP's:  Recent Labs   Lab Test 05/09/25  1210      POTASSIUM 3.7   CHLORIDE 97*   CO2 34*   BUN 12.1   CR 0.73   ANIONGAP 9   DAVID 9.2   *       ASSESSMENT/PLAN:  (I50.32) Chronic diastolic heart failure (H)  (primary encounter diagnosis)  Comment: resp. Status stable. Increased LE edema  Plan: 1. Increase bumex to 2 mg every day x 7 days, then resume 1 mg every day  2. Follow wt.s  3. Cont. Compression stockings to Les  4. Bmp next week    (I10) Hypertension, unspecified type  Comment: BP stable  Plan: 1. Cont. Cardizem  2. Follow Bps, HRs    (J43.9) Pulmonary emphysema, unspecified emphysema type (H)  Comment: resp. Status stable. Uses O2 at hs for hypoventilation. No recent wheezing or reports of CAIN  Plan: 1. Cont. Trelegy  2. Cont. O2 at hs  3. Follow O2 sats, monitor for sob    (G60.9) Idiopathic peripheral neuropathy  Comment: pain currently stable. Some LLE weakness. Gait steady with walker  Plan: 1. Cont. Hs gabapentin  2.  Monitor for increased LE pain  3. Cont. PT    (K64.4) External hemorrhoids  Comment: not able to reduce. Not actively bleeding  Plan: 1. Colorectal surg. Appt 6/6/25  2. Monitor for constipation  3. Monitor for rectal bleed    (Z71.89) ACP (advance care planning)  Comment: DNR/DNI  Plan: 1. POLST in chart          Electronically signed by:  DANIKA Barreto CNP                        Sincerely,        DANIKA Barreto CNP    Electronically signed

## 2025-06-03 NOTE — LETTER
6/3/2025      Marta Tavarez  [address]  [facility] [address]        [place] [facility]  PRIMARY CARE PROVIDER AND CLINIC:  Howard Tamayo, APRN CNP, [address] / [place] [address]  Chief Complaint   Patient presents with     [facility] Medical Record Number:  [id number]  Place of Service where encounter took place:  [facility] [[id number]]    Marta Tavarez  is a 89 year old  (1935), admitted to the above facility from  [facility]. Hospital stay 5/23/25 through 5/23/25..  Admitted to this facility for  rehab, medical management, and nursing care.    HPI:    HPI information obtained from: facility chart records, facility staff, patient report, and [facility] chart review.   Brief Summary of Hospital Course:     HF: LE edema. Resp. Status gen. Stable uses O2 at hs for sleep related hypoventilation. Wt stable. Ongoing LE edema. 5/9/25 ED visit for LE edema R calf pain. US neg. Did show R baker's cyct. R LE pain resolved. Wears compression stockings. Cont. On bumex. Has increased dose at times for increase in LE edema.     HTN. Cont on cardizem. BP stable. No reports of dizziness     Emphysema: O2 sats stable. No recent wheezing or cough. No reports of CAIN. Cont. On trelegy.    Peripheral neuropathy. H/o LLE pain, denies pain today. Taking hs gabapentin. Has some gen. LE weakness. Favors LLE with amb. Slowed gait. Working with PT.     Hemorrhoids, external. More recently not able to reduce. No active bleed. ED visit 5/25/25 for eval. Given referral for colorectal surg.       Updates on Status Since Skilled nursing Admission: mood gen. Stable. Completed keflex course for UTI    CODE STATUS/ADVANCE DIRECTIVES DISCUSSION:   DNR / DNI  Patient's living condition: lives in an assisted living facility  ALLERGIES: Atorvastatin and Ibuprofen  PAST MEDICAL HISTORY:  has a past medical history of Bilateral  leg edema, COPD (chronic obstructive pulmonary disease) (H), Cyst of pancreas, Gastroesophageal reflux disease without esophagitis, Hemorrhoids, unspecified hemorrhoid type, HTN (hypertension), Idiopathic sleep-related hypoventilation, Neuropathy, and Osteoarthritis.  PAST SURGICAL HISTORY:   has a past surgical history that includes hernia repair; joint replacement; Ercp W/ Sphincterotomy And Balloon Dilation (N/A); and Cholecystectomy (N/A).  FAMILY HISTORY: family history is not on file.  SOCIAL HISTORY:       Post Discharge Medication Reconciliation Status: discharge medications reconciled, continue medications without change    Current Outpatient Medications   Medication Sig Dispense Refill     bumetanide (BUMEX) 1 MG tablet Take 1 mg by mouth every morning.       calcium citrate-vitamin D (CITRACAL) 200-6.25 MG-MCG TABS per tablet Take 1 tablet by mouth daily.       diltiazem ER (DILT-XR) 180 MG 24 hr capsule Take 180 mg by mouth daily.       Fluticasone-Umeclidin-Vilant (TRELEGY ELLIPTA) 100-62.5-25 MCG/ACT oral inhaler Inhale 1 puff into the lungs daily.       gabapentin (NEURONTIN) 100 MG capsule Take 100 mg by mouth at bedtime.       loperamide (IMODIUM) 2 MG capsule Take 4 mg by mouth as needed for diarrhea.       multivitamin, therapeutic (THERA-VIT) TABS tablet Take 1 tablet by mouth daily.       polyethylene glycol-propylene glycol (SYSTANE ULTRA) 0.4-0.3 % SOLN ophthalmic solution Place 1 drop into both eyes every 2 hours as needed for dry eyes.           ROS:  No chest pain, shortness of breath, fevers, chills, headache, nausea, vomiting, dysuria or bowel abnormalities.  Appetite is  normal.  No pain except occ LEs.    Vitals:  /59   Pulse 77   Resp 16   Wt 64 kg (141 lb)   SpO2 94%   Exam:  GENERAL APPEARANCE:  Alert, in no distress, cooperative  ENT:  Mouth and posterior oropharynx normal, moist mucous membranes, [place], cerumen bilat ear canals  EYES:  EOM, conjunctivae, lids, pupils and  irises normal, PERRL, no drainage  NECK:  No adenopathy,masses or thyromegaly, FROM, no carotid bruit  RESP:  respiratory effort and palpation of chest normal, lungs clear to auscultation , no respiratory distress, diminished breath sounds bibasilar  CV:  Palpation and auscultation of heart done , regular rate and rhythm, no murmur, rub, or gallop, peripheral edema 2-3+ in LEs  ABDOMEN:  normal bowel sounds, soft, nontender, no hepatosplenomegaly or other masses, no guarding or rebound, no bruits  M/S:   muscle strength 5/5 all 4 ext. Some gen LE weakness L>R. Stands indep. Gait steady wth walker, sl favors LLE  SKIN:  external hemorrhoid x 3, no active bleed  NEURO:   Cranial nerves 2-12 are normal tested and grossly at patient's baseline, speech fluid, no tremor  PSYCH:  memory impaired , affect and mood normal, no apparent anxiety    Lab/Diagnostic data:  Most Recent 3 CBC's:  Recent Labs   Lab Test 05/09/25  1210   WBC 6.0   HGB 11.0*   MCV 84        Most Recent 3 BMP's:  Recent Labs   Lab Test 05/09/25  1210      POTASSIUM 3.7   CHLORIDE 97*   CO2 34*   BUN 12.1   CR 0.73   ANIONGAP 9   DAVID 9.2   *       ASSESSMENT/PLAN:  (I50.32) Chronic diastolic heart failure (H)  (primary encounter diagnosis)  Comment: resp. Status stable. Increased LE edema  Plan: 1. Increase bumex to 2 mg every day x 7 days, then resume 1 mg every day  2. Follow wt.s  3. Cont. Compression stockings to Les  4. Bmp next week    (I10) Hypertension, unspecified type  Comment: BP stable  Plan: 1. Cont. Cardizem  2. Follow Bps, HRs    (J43.9) Pulmonary emphysema, unspecified emphysema type (H)  Comment: resp. Status stable. Uses O2 at hs for hypoventilation. No recent wheezing or reports of CAIN  Plan: 1. Cont. Trelegy  2. Cont. O2 at hs  3. Follow O2 sats, monitor for sob    (G60.9) Idiopathic peripheral neuropathy  Comment: pain currently stable. Some LLE weakness. Gait steady with walker  Plan: 1. Cont. Hs gabapentin  2.  Monitor for increased LE pain  3. Cont. PT    (K64.4) External hemorrhoids  Comment: not able to reduce. Not actively bleeding  Plan: 1. Colorectal surg. Appt 6/6/25  2. Monitor for constipation  3. Monitor for rectal bleed    (Z71.89) ACP (advance care planning)  Comment: DNR/DNI  Plan: 1. POLST in chart          Electronically signed by:  DANIKA Barreto CNP                        Sincerely,        DANIKA Barreto CNP    Electronically signed

## 2025-06-03 NOTE — PROGRESS NOTES
[place] [facility]  PRIMARY CARE PROVIDER AND CLINIC:  Howard Tamayo, DANIKA CNP, [address] [facility] [address]  Chief Complaint   Patient presents with    [facility]     [place] Medical Record Number:  [id number]  Place of Service where encounter took place:  [facility] [[id number]]    Marta Tavarez  is a 89 year old  (1935), admitted to the above facility from  [facility]. Hospital stay 5/23/25 through 5/23/25..  Admitted to this facility for  rehab, medical management, and nursing care.    HPI:    HPI information obtained from: facility chart records, facility staff, patient report, and [facility] chart review.   Brief Summary of Hospital Course:     HF: LE edema. Resp. Status gen. Stable uses O2 at hs for sleep related hypoventilation. Wt stable. Ongoing LE edema. 5/9/25 ED visit for LE edema R calf pain. US neg. Did show R baker's cyct. R LE pain resolved. Wears compression stockings. Cont. On bumex. Has increased dose at times for increase in LE edema.     HTN. Cont on cardizem. BP stable. No reports of dizziness     Emphysema: O2 sats stable. No recent wheezing or cough. No reports of CAIN. Cont. On trelegy.    Peripheral neuropathy. H/o LLE pain, denies pain today. Taking hs gabapentin. Has some gen. LE weakness. Favors LLE with amb. Slowed gait. Working with PT.     Hemorrhoids, external. More recently not able to reduce. No active bleed. ED visit 5/25/25 for eval. Given referral for colorectal surg.       Updates on Status Since Skilled nursing Admission: mood gen. Stable. Completed keflex course for UTI    CODE STATUS/ADVANCE DIRECTIVES DISCUSSION:   DNR / DNI  Patient's living condition: lives in an assisted living facility  ALLERGIES: Atorvastatin and Ibuprofen  PAST MEDICAL HISTORY:  has a past medical history of Bilateral leg edema, COPD (chronic obstructive pulmonary disease) (H), Cyst of pancreas,  Gastroesophageal reflux disease without esophagitis, Hemorrhoids, unspecified hemorrhoid type, HTN (hypertension), Idiopathic sleep-related hypoventilation, Neuropathy, and Osteoarthritis.  PAST SURGICAL HISTORY:   has a past surgical history that includes hernia repair; joint replacement; Ercp W/ Sphincterotomy And Balloon Dilation (N/A); and Cholecystectomy (N/A).  FAMILY HISTORY: family history is not on file.  SOCIAL HISTORY:       Post Discharge Medication Reconciliation Status: discharge medications reconciled, continue medications without change    Current Outpatient Medications   Medication Sig Dispense Refill    bumetanide (BUMEX) 1 MG tablet Take 1 mg by mouth every morning.      calcium citrate-vitamin D (CITRACAL) 200-6.25 MG-MCG TABS per tablet Take 1 tablet by mouth daily.      diltiazem ER (DILT-XR) 180 MG 24 hr capsule Take 180 mg by mouth daily.      Fluticasone-Umeclidin-Vilant (TRELEGY ELLIPTA) 100-62.5-25 MCG/ACT oral inhaler Inhale 1 puff into the lungs daily.      gabapentin (NEURONTIN) 100 MG capsule Take 100 mg by mouth at bedtime.      loperamide (IMODIUM) 2 MG capsule Take 4 mg by mouth as needed for diarrhea.      multivitamin, therapeutic (THERA-VIT) TABS tablet Take 1 tablet by mouth daily.      polyethylene glycol-propylene glycol (SYSTANE ULTRA) 0.4-0.3 % SOLN ophthalmic solution Place 1 drop into both eyes every 2 hours as needed for dry eyes.           ROS:  No chest pain, shortness of breath, fevers, chills, headache, nausea, vomiting, dysuria or bowel abnormalities.  Appetite is  normal.  No pain except occ LEs.    Vitals:  /59   Pulse 77   Resp 16   Wt 64 kg (141 lb)   SpO2 94%   Exam:  GENERAL APPEARANCE:  Alert, in no distress, cooperative  ENT:  Mouth and posterior oropharynx normal, moist mucous membranes, [place], cerumen bilat ear canals  EYES:  EOM, conjunctivae, lids, pupils and irises normal, PERRL, no drainage  NECK:  No adenopathy,masses or thyromegaly, FROM, no  carotid bruit  RESP:  respiratory effort and palpation of chest normal, lungs clear to auscultation , no respiratory distress, diminished breath sounds bibasilar  CV:  Palpation and auscultation of heart done , regular rate and rhythm, no murmur, rub, or gallop, peripheral edema 2-3+ in LEs  ABDOMEN:  normal bowel sounds, soft, nontender, no hepatosplenomegaly or other masses, no guarding or rebound, no bruits  M/S:   muscle strength 5/5 all 4 ext. Some gen LE weakness L>R. Stands indep. Gait steady wth walker, sl favors LLE  SKIN:  external hemorrhoid x 3, no active bleed  NEURO:   Cranial nerves 2-12 are normal tested and grossly at patient's baseline, speech fluid, no tremor  PSYCH:  memory impaired , affect and mood normal, no apparent anxiety    Lab/Diagnostic data:  Most Recent 3 CBC's:  Recent Labs   Lab Test 05/09/25  1210   WBC 6.0   HGB 11.0*   MCV 84        Most Recent 3 BMP's:  Recent Labs   Lab Test 05/09/25  1210      POTASSIUM 3.7   CHLORIDE 97*   CO2 34*   BUN 12.1   CR 0.73   ANIONGAP 9   DAVID 9.2   *       ASSESSMENT/PLAN:  (I50.32) Chronic diastolic heart failure (H)  (primary encounter diagnosis)  Comment: resp. Status stable. Increased LE edema  Plan: 1. Increase bumex to 2 mg every day x 7 days, then resume 1 mg every day  2. Follow wt.s  3. Cont. Compression stockings to Les  4. Bmp next week    (I10) Hypertension, unspecified type  Comment: BP stable  Plan: 1. Cont. Cardizem  2. Follow Bps, HRs    (J43.9) Pulmonary emphysema, unspecified emphysema type (H)  Comment: resp. Status stable. Uses O2 at hs for hypoventilation. No recent wheezing or reports of CAIN  Plan: 1. Cont. Trelegy  2. Cont. O2 at hs  3. Follow O2 sats, monitor for sob    (G60.9) Idiopathic peripheral neuropathy  Comment: pain currently stable. Some LLE weakness. Gait steady with walker  Plan: 1. Cont. Hs gabapentin  2. Monitor for increased LE pain  3. Cont. PT    (K64.4) External hemorrhoids  Comment: not  able to reduce. Not actively bleeding  Plan: 1. Colorectal surg. Appt 6/6/25  2. Monitor for constipation  3. Monitor for rectal bleed    (Z71.89) ACP (advance care planning)  Comment: DNR/DNI  Plan: 1. POLST in chart          Electronically signed by:  DANIKA Barreto CNP

## 2025-06-04 ENCOUNTER — LAB REQUISITION (OUTPATIENT)
Dept: LAB | Facility: CLINIC | Age: 89
End: 2025-06-04
Payer: MEDICARE

## 2025-06-04 DIAGNOSIS — R60.9 EDEMA, UNSPECIFIED: ICD-10-CM

## 2025-06-05 NOTE — ED TRIAGE NOTES
Pt arrives with friend who noted increased right leg swelling and pain. Pt has been dragging her right leg when walking as well per friend. Pt is currently on diuretics.      Triage Assessment (Adult)       Row Name 05/09/25 1104          Triage Assessment    Airway WDL WDL        Respiratory WDL    Respiratory WDL WDL        Cardiac WDL    Cardiac WDL WDL                      What Type Of Note Output Would You Prefer (Optional)?: Standard Output What Is The Reason For Today's Visit?: Skin Lesions What Is The Reason For Today's Visit? (Being Monitored For X): concerning skin lesions on an annual basis

## 2025-06-09 ENCOUNTER — RESULTS FOLLOW-UP (OUTPATIENT)
Dept: GERIATRICS | Facility: CLINIC | Age: 89
End: 2025-06-09

## 2025-06-09 LAB
ANION GAP SERPL CALCULATED.3IONS-SCNC: 11 MMOL/L (ref 7–15)
BUN SERPL-MCNC: 10.7 MG/DL (ref 8–23)
CALCIUM SERPL-MCNC: 9.1 MG/DL (ref 8.8–10.4)
CHLORIDE SERPL-SCNC: 97 MMOL/L (ref 98–107)
CREAT SERPL-MCNC: 0.78 MG/DL (ref 0.51–0.95)
EGFRCR SERPLBLD CKD-EPI 2021: 73 ML/MIN/1.73M2
GLUCOSE SERPL-MCNC: 100 MG/DL (ref 70–99)
HCO3 SERPL-SCNC: 32 MMOL/L (ref 22–29)
POTASSIUM SERPL-SCNC: 3.3 MMOL/L (ref 3.4–5.3)
SODIUM SERPL-SCNC: 140 MMOL/L (ref 135–145)

## 2025-06-19 ENCOUNTER — LAB REQUISITION (OUTPATIENT)
Dept: LAB | Facility: CLINIC | Age: 89
End: 2025-06-19
Payer: MEDICARE

## 2025-06-19 ENCOUNTER — ASSISTED LIVING VISIT (OUTPATIENT)
Dept: GERIATRICS | Facility: CLINIC | Age: 89
End: 2025-06-19
Payer: MEDICARE

## 2025-06-19 VITALS
OXYGEN SATURATION: 94 % | SYSTOLIC BLOOD PRESSURE: 127 MMHG | HEART RATE: 84 BPM | RESPIRATION RATE: 16 BRPM | DIASTOLIC BLOOD PRESSURE: 68 MMHG

## 2025-06-19 DIAGNOSIS — G60.9 IDIOPATHIC PERIPHERAL NEUROPATHY: ICD-10-CM

## 2025-06-19 DIAGNOSIS — R60.0 LOCALIZED EDEMA: ICD-10-CM

## 2025-06-19 DIAGNOSIS — I10 HYPERTENSION, UNSPECIFIED TYPE: ICD-10-CM

## 2025-06-19 DIAGNOSIS — I50.32 CHRONIC DIASTOLIC HEART FAILURE (H): Primary | ICD-10-CM

## 2025-06-19 RX ORDER — HYDROCORTISONE ACETATE 25 MG/1
25 SUPPOSITORY RECTAL EVERY EVENING
COMMUNITY

## 2025-06-19 NOTE — LETTER
6/19/2025      Marta Tavarez  [address] [facility] [facility] [address]        [place] [facility]  [place] Medical Record Number:  [id number]  Place of Service where encounter took place:  [facility] ([facility]) [[id number]]  Chief Complaint   Patient presents with     Edema       HPI:    Marta Tavarez  is a 89 year old (1935), who is being seen today for an episodic care visit.  HPI information obtained from: facility chart records, facility staff, patient report, and [facility] chart review. Today's concern is: HF, HTN, neuropathy. Recent transfer to [facility] care unit. Appears to be adjusting well. Had recent increase in LE edema. Bumex dose increased x 7 days-ended 6/10/25. Resumed bumex 1mg every day. LE edema improved. Resp. Status stable. Has increased amb. With walker. Reports LE neuropathy has improved. Cont on tylenol, gabapentin. Has sl low K+ last bmp.        Past Medical and Surgical History reviewed in Epic today.    MEDICATIONS:    Current Outpatient Medications   Medication Sig Dispense Refill     hydrocortisone (ANUSOL-HC) 25 MG suppository Place 25 mg rectally every evening.       bumetanide (BUMEX) 1 MG tablet Take 1 mg by mouth every morning.       calcium citrate-vitamin D (CITRACAL) 200-6.25 MG-MCG TABS per tablet Take 1 tablet by mouth daily.       diltiazem ER (DILT-XR) 180 MG 24 hr capsule Take 180 mg by mouth daily.       Fluticasone-Umeclidin-Vilant (TRELEGY ELLIPTA) 100-62.5-25 MCG/ACT oral inhaler Inhale 1 puff into the lungs daily.       gabapentin (NEURONTIN) 100 MG capsule Take 100 mg by mouth at bedtime.       loperamide (IMODIUM) 2 MG capsule Take 4 mg by mouth as needed for diarrhea.       multivitamin, therapeutic (THERA-VIT) TABS tablet Take 1 tablet by mouth daily.       polyethylene glycol-propylene glycol (SYSTANE ULTRA) 0.4-0.3 % SOLN ophthalmic solution Place 1 drop into both eyes every 2 hours as needed for dry eyes.            REVIEW OF SYSTEMS:  No chest pain, shortness of breath, fevers, chills, headache, nausea, vomiting, dysuria or bowel abnormalities.  Appetite is  normal.  No pain except occ LEs.    Objective:  /68   Pulse 84   Resp 16   SpO2 94%   Exam:  GENERAL APPEARANCE:  Alert, in no distress, cooperative  ENT:  Mouth and posterior oropharynx normal, moist mucous membranes, [place]  EYES:  EOM, conjunctivae, lids, pupils and irises normal, PERRL  RESP:  respiratory effort and palpation of chest normal, lungs clear to auscultation , no respiratory distress  CV:  Palpation and auscultation of heart done , peripheral edema 2+ in LEs, rate-normal, grade 2/6 murmur  ABDOMEN:  normal bowel sounds, soft, nontender, no hepatosplenomegaly or other masses, no guarding or rebound  M/S:   gait steady with walker. Muscle strength 5/5 all 4 ext.  NEURO:   Cranial nerves 2-12 are normal tested and grossly at patient's baseline, speech fluid  PSYCH:  memory impaired , affect and mood normal    Labs:   Most Recent 3 CBC's:  Recent Labs   Lab Test 05/09/25  1210   WBC 6.0   HGB 11.0*   MCV 84        Most Recent 3 BMP's:  Recent Labs   Lab Test 06/09/25  0822 05/09/25  1210    140   POTASSIUM 3.3* 3.7   CHLORIDE 97* 97*   CO2 32* 34*   BUN 10.7 12.1   CR 0.78 0.73   ANIONGAP 11 9   DAVID 9.1 9.2   * 102*       ASSESSMENT/PLAN:  (I50.32) Chronic diastolic heart failure (H)  (primary encounter diagnosis)  Comment: increased bumex dose x 7 days. LE edema improved.   Plan: 1.cont. bumex 1 mg every day  2. Recheck wt today  3. Cont. Compression stockings  4. Bmp next week    (I10) Hypertension, unspecified type  Comment: BP stable  Plan: 1. Cont. Cardizem  2. Follow BPS, Hrs  3. Monitor for dizziness    (G60.9) Idiopathic peripheral neuropathy  Comment: improved. Increased amb. Less LE pain  Plan: 1. Cont. Gabapentin  2. Cont. PT, encourage amb as tawny  3. Cont. tylenol              Electronically signed by:  Howard Alberts  DANIKA Tamayo CNP             Sincerely,        DANIKA Barreto CNP    Electronically signed

## 2025-06-19 NOTE — PROGRESS NOTES
[place] [facility]  [place] Medical Record Number:  [id number]  Place of Service where encounter took place:  [facility] ([facility]) [[id number]]  Chief Complaint   Patient presents with    Edema       HPI:    Marta Tavarez  is a 89 year old (1935), who is being seen today for an episodic care visit.  HPI information obtained from: facility chart records, facility staff, patient report, and [facility] chart review. Today's concern is: HF, HTN, neuropathy. Recent transfer to [facility] memory care unit. Appears to be adjusting well. Had recent increase in LE edema. Bumex dose increased x 7 days-ended 6/10/25. Resumed bumex 1mg every day. LE edema improved. Resp. Status stable. Has increased amb. With walker. Reports LE neuropathy has improved. Cont on tylenol, gabapentin. Has sl low K+ last bmp.        Past Medical and Surgical History reviewed in Epic today.    MEDICATIONS:    Current Outpatient Medications   Medication Sig Dispense Refill    hydrocortisone (ANUSOL-HC) 25 MG suppository Place 25 mg rectally every evening.      bumetanide (BUMEX) 1 MG tablet Take 1 mg by mouth every morning.      calcium citrate-vitamin D (CITRACAL) 200-6.25 MG-MCG TABS per tablet Take 1 tablet by mouth daily.      diltiazem ER (DILT-XR) 180 MG 24 hr capsule Take 180 mg by mouth daily.      Fluticasone-Umeclidin-Vilant (TRELEGY ELLIPTA) 100-62.5-25 MCG/ACT oral inhaler Inhale 1 puff into the lungs daily.      gabapentin (NEURONTIN) 100 MG capsule Take 100 mg by mouth at bedtime.      loperamide (IMODIUM) 2 MG capsule Take 4 mg by mouth as needed for diarrhea.      multivitamin, therapeutic (THERA-VIT) TABS tablet Take 1 tablet by mouth daily.      polyethylene glycol-propylene glycol (SYSTANE ULTRA) 0.4-0.3 % SOLN ophthalmic solution Place 1 drop into both eyes every 2 hours as needed for dry eyes.           REVIEW OF SYSTEMS:  No chest pain, shortness of breath, fevers, chills, headache,  nausea, vomiting, dysuria or bowel abnormalities.  Appetite is  normal.  No pain except occ LEs.    Objective:  /68   Pulse 84   Resp 16   SpO2 94%   Exam:  GENERAL APPEARANCE:  Alert, in no distress, cooperative  ENT:  Mouth and posterior oropharynx normal, moist mucous membranes, [place]  EYES:  EOM, conjunctivae, lids, pupils and irises normal, PERRL  RESP:  respiratory effort and palpation of chest normal, lungs clear to auscultation , no respiratory distress  CV:  Palpation and auscultation of heart done , peripheral edema 2+ in LEs, rate-normal, grade 2/6 murmur  ABDOMEN:  normal bowel sounds, soft, nontender, no hepatosplenomegaly or other masses, no guarding or rebound  M/S:   gait steady with walker. Muscle strength 5/5 all 4 ext.  NEURO:   Cranial nerves 2-12 are normal tested and grossly at patient's baseline, speech fluid  PSYCH:  memory impaired , affect and mood normal    Labs:   Most Recent 3 CBC's:  Recent Labs   Lab Test 05/09/25  1210   WBC 6.0   HGB 11.0*   MCV 84        Most Recent 3 BMP's:  Recent Labs   Lab Test 06/09/25  0822 05/09/25  1210    140   POTASSIUM 3.3* 3.7   CHLORIDE 97* 97*   CO2 32* 34*   BUN 10.7 12.1   CR 0.78 0.73   ANIONGAP 11 9   DAVID 9.1 9.2   * 102*       ASSESSMENT/PLAN:  (I50.32) Chronic diastolic heart failure (H)  (primary encounter diagnosis)  Comment: increased bumex dose x 7 days. LE edema improved.   Plan: 1.cont. bumex 1 mg every day  2. Recheck wt today  3. Cont. Compression stockings  4. Bmp next week    (I10) Hypertension, unspecified type  Comment: BP stable  Plan: 1. Cont. Cardizem  2. Follow BPS, Hrs  3. Monitor for dizziness    (G60.9) Idiopathic peripheral neuropathy  Comment: improved. Increased amb. Less LE pain  Plan: 1. Cont. Gabapentin  2. Cont. PT, encourage amb as tawny  3. Cont. tylenol              Electronically signed by:  DANIKA Barreto CNP

## 2025-06-23 LAB
ANION GAP SERPL CALCULATED.3IONS-SCNC: 8 MMOL/L (ref 7–15)
BUN SERPL-MCNC: 12.9 MG/DL (ref 8–23)
CALCIUM SERPL-MCNC: 8.9 MG/DL (ref 8.8–10.4)
CHLORIDE SERPL-SCNC: 101 MMOL/L (ref 98–107)
CREAT SERPL-MCNC: 0.78 MG/DL (ref 0.51–0.95)
EGFRCR SERPLBLD CKD-EPI 2021: 73 ML/MIN/1.73M2
GLUCOSE SERPL-MCNC: 84 MG/DL (ref 70–99)
HCO3 SERPL-SCNC: 33 MMOL/L (ref 22–29)
POTASSIUM SERPL-SCNC: 3.7 MMOL/L (ref 3.4–5.3)
SODIUM SERPL-SCNC: 142 MMOL/L (ref 135–145)

## 2025-06-23 PROCEDURE — 36415 COLL VENOUS BLD VENIPUNCTURE: CPT | Mod: ORL | Performed by: NURSE PRACTITIONER

## 2025-06-23 PROCEDURE — P9603 ONE-WAY ALLOW PRORATED MILES: HCPCS | Mod: ORL | Performed by: NURSE PRACTITIONER

## 2025-06-23 PROCEDURE — 80048 BASIC METABOLIC PNL TOTAL CA: CPT | Mod: ORL | Performed by: NURSE PRACTITIONER

## 2025-06-26 ENCOUNTER — LAB REQUISITION (OUTPATIENT)
Dept: LAB | Facility: CLINIC | Age: 89
End: 2025-06-26
Payer: MEDICARE

## 2025-06-26 DIAGNOSIS — N39.0 URINARY TRACT INFECTION, SITE NOT SPECIFIED: ICD-10-CM

## 2025-06-26 LAB
ALBUMIN UR-MCNC: 30 MG/DL
APPEARANCE UR: ABNORMAL
BILIRUB UR QL STRIP: NEGATIVE
COLOR UR AUTO: ABNORMAL
GLUCOSE UR STRIP-MCNC: NEGATIVE MG/DL
HGB UR QL STRIP: ABNORMAL
KETONES UR STRIP-MCNC: NEGATIVE MG/DL
LEUKOCYTE ESTERASE UR QL STRIP: ABNORMAL
MUCOUS THREADS #/AREA URNS LPF: PRESENT /LPF
NITRATE UR QL: NEGATIVE
PH UR STRIP: 7 [PH] (ref 5–7)
RBC URINE: 7 /HPF
SP GR UR STRIP: 1.02 (ref 1–1.03)
SQUAMOUS EPITHELIAL: 3 /HPF
TRANSITIONAL EPI: 1 /HPF
UROBILINOGEN UR STRIP-MCNC: NORMAL MG/DL
WBC URINE: >182 /HPF

## 2025-06-26 PROCEDURE — 87088 URINE BACTERIA CULTURE: CPT | Mod: ORL | Performed by: NURSE PRACTITIONER

## 2025-06-26 PROCEDURE — 81001 URINALYSIS AUTO W/SCOPE: CPT | Mod: ORL | Performed by: NURSE PRACTITIONER

## 2025-06-27 LAB
BACTERIA UR CULT: ABNORMAL
BACTERIA UR CULT: ABNORMAL

## 2025-08-01 PROCEDURE — 87086 URINE CULTURE/COLONY COUNT: CPT | Mod: ORL | Performed by: NURSE PRACTITIONER

## 2025-08-01 PROCEDURE — 81001 URINALYSIS AUTO W/SCOPE: CPT | Mod: ORL | Performed by: NURSE PRACTITIONER

## 2025-08-02 ENCOUNTER — LAB REQUISITION (OUTPATIENT)
Dept: LAB | Facility: CLINIC | Age: 89
End: 2025-08-02
Payer: MEDICARE

## 2025-08-02 DIAGNOSIS — N39.0 URINARY TRACT INFECTION, SITE NOT SPECIFIED: ICD-10-CM

## 2025-08-02 LAB
ALBUMIN UR-MCNC: 10 MG/DL
APPEARANCE UR: ABNORMAL
BILIRUB UR QL STRIP: NEGATIVE
COLOR UR AUTO: YELLOW
GLUCOSE UR STRIP-MCNC: NEGATIVE MG/DL
HGB UR QL STRIP: ABNORMAL
KETONES UR STRIP-MCNC: NEGATIVE MG/DL
LEUKOCYTE ESTERASE UR QL STRIP: ABNORMAL
NITRATE UR QL: NEGATIVE
PH UR STRIP: 7 [PH] (ref 5–7)
RBC URINE: 4 /HPF
SP GR UR STRIP: 1.01 (ref 1–1.03)
SQUAMOUS EPITHELIAL: 2 /HPF
UROBILINOGEN UR STRIP-MCNC: NORMAL MG/DL
WBC CLUMPS #/AREA URNS HPF: PRESENT /HPF
WBC URINE: >182 /HPF

## 2025-08-03 ENCOUNTER — LAB REQUISITION (OUTPATIENT)
Dept: LAB | Facility: CLINIC | Age: 89
End: 2025-08-03
Payer: MEDICARE

## 2025-08-03 DIAGNOSIS — N39.0 URINARY TRACT INFECTION, SITE NOT SPECIFIED: ICD-10-CM

## 2025-08-03 LAB
ALBUMIN UR-MCNC: NEGATIVE MG/DL
APPEARANCE UR: ABNORMAL
BACTERIA #/AREA URNS HPF: ABNORMAL /HPF
BACTERIA UR CULT: NORMAL
BILIRUB UR QL STRIP: NEGATIVE
COLOR UR AUTO: ABNORMAL
GLUCOSE UR STRIP-MCNC: NEGATIVE MG/DL
HGB UR QL STRIP: ABNORMAL
KETONES UR STRIP-MCNC: NEGATIVE MG/DL
LEUKOCYTE ESTERASE UR QL STRIP: ABNORMAL
NITRATE UR QL: NEGATIVE
PH UR STRIP: 7.5 [PH] (ref 5–7)
RBC URINE: 6 /HPF
SP GR UR STRIP: 1.01 (ref 1–1.03)
SQUAMOUS EPITHELIAL: <1 /HPF
UROBILINOGEN UR STRIP-MCNC: NORMAL MG/DL
WBC CLUMPS #/AREA URNS HPF: PRESENT /HPF
WBC URINE: >182 /HPF

## 2025-08-03 PROCEDURE — 87186 SC STD MICRODIL/AGAR DIL: CPT | Mod: ORL | Performed by: NURSE PRACTITIONER

## 2025-08-03 PROCEDURE — 81001 URINALYSIS AUTO W/SCOPE: CPT | Mod: ORL | Performed by: NURSE PRACTITIONER

## 2025-08-04 LAB — BACTERIA UR CULT: ABNORMAL

## 2025-08-07 ENCOUNTER — ASSISTED LIVING VISIT (OUTPATIENT)
Dept: GERIATRICS | Facility: CLINIC | Age: 89
End: 2025-08-07
Payer: MEDICARE

## 2025-08-07 VITALS
SYSTOLIC BLOOD PRESSURE: 129 MMHG | DIASTOLIC BLOOD PRESSURE: 72 MMHG | RESPIRATION RATE: 16 BRPM | HEART RATE: 86 BPM | OXYGEN SATURATION: 95 %

## 2025-08-07 DIAGNOSIS — I10 HYPERTENSION, UNSPECIFIED TYPE: ICD-10-CM

## 2025-08-07 DIAGNOSIS — J43.9 PULMONARY EMPHYSEMA, UNSPECIFIED EMPHYSEMA TYPE (H): ICD-10-CM

## 2025-08-07 DIAGNOSIS — N30.00 ACUTE CYSTITIS WITHOUT HEMATURIA: Primary | ICD-10-CM

## 2025-08-18 ENCOUNTER — LAB REQUISITION (OUTPATIENT)
Dept: LAB | Facility: CLINIC | Age: 89
End: 2025-08-18
Payer: MEDICARE

## 2025-08-19 ENCOUNTER — ASSISTED LIVING VISIT (OUTPATIENT)
Dept: GERIATRICS | Facility: CLINIC | Age: 89
End: 2025-08-19
Payer: MEDICARE

## 2025-08-19 VITALS
DIASTOLIC BLOOD PRESSURE: 66 MMHG | RESPIRATION RATE: 18 BRPM | OXYGEN SATURATION: 96 % | SYSTOLIC BLOOD PRESSURE: 120 MMHG | HEART RATE: 84 BPM

## 2025-08-19 DIAGNOSIS — N30.01 ACUTE CYSTITIS WITH HEMATURIA: Primary | ICD-10-CM

## 2025-08-19 DIAGNOSIS — J43.9 PULMONARY EMPHYSEMA, UNSPECIFIED EMPHYSEMA TYPE (H): ICD-10-CM

## 2025-08-19 DIAGNOSIS — I10 HYPERTENSION, UNSPECIFIED TYPE: ICD-10-CM

## 2025-08-19 PROCEDURE — 99349 HOME/RES VST EST MOD MDM 40: CPT | Performed by: NURSE PRACTITIONER

## 2025-08-20 ENCOUNTER — LAB REQUISITION (OUTPATIENT)
Dept: LAB | Facility: CLINIC | Age: 89
End: 2025-08-20
Payer: MEDICARE

## 2025-08-20 DIAGNOSIS — R60.0 LOCALIZED EDEMA: ICD-10-CM

## 2025-08-25 LAB
ANION GAP SERPL CALCULATED.3IONS-SCNC: 10 MMOL/L (ref 7–15)
BUN SERPL-MCNC: 67 MG/DL (ref 8–23)
CALCIUM SERPL-MCNC: 10.7 MG/DL (ref 8.8–10.4)
CHLORIDE SERPL-SCNC: 95 MMOL/L (ref 98–107)
CREAT SERPL-MCNC: 1.54 MG/DL (ref 0.51–0.95)
EGFRCR SERPLBLD CKD-EPI 2021: 32 ML/MIN/1.73M2
GLUCOSE SERPL-MCNC: 101 MG/DL (ref 70–99)
HCO3 SERPL-SCNC: 30 MMOL/L (ref 22–29)
POTASSIUM SERPL-SCNC: 3.8 MMOL/L (ref 3.4–5.3)
SODIUM SERPL-SCNC: 135 MMOL/L (ref 135–145)

## 2025-08-26 ENCOUNTER — HOSPITAL ENCOUNTER (INPATIENT)
Facility: CLINIC | Age: 89
DRG: 871 | End: 2025-08-26
Attending: EMERGENCY MEDICINE | Admitting: HOSPITALIST
Payer: MEDICARE

## 2025-08-26 ENCOUNTER — APPOINTMENT (OUTPATIENT)
Dept: GENERAL RADIOLOGY | Facility: CLINIC | Age: 89
DRG: 871 | End: 2025-08-26
Attending: EMERGENCY MEDICINE
Payer: MEDICARE

## 2025-08-26 PROBLEM — N39.0 UTI (URINARY TRACT INFECTION) WITH PYURIA: Status: ACTIVE | Noted: 2025-08-26

## 2025-08-26 PROBLEM — N39.0 UTI (URINARY TRACT INFECTION): Status: ACTIVE | Noted: 2025-08-26

## 2025-08-26 PROCEDURE — 71045 X-RAY EXAM CHEST 1 VIEW: CPT

## 2025-08-26 ASSESSMENT — ACTIVITIES OF DAILY LIVING (ADL)
ADLS_ACUITY_SCORE: 43
ADLS_ACUITY_SCORE: 41
ADLS_ACUITY_SCORE: 43
ADLS_ACUITY_SCORE: 41
ADLS_ACUITY_SCORE: 43
ADLS_ACUITY_SCORE: 41

## 2025-08-26 ASSESSMENT — COLUMBIA-SUICIDE SEVERITY RATING SCALE - C-SSRS
1. IN THE PAST MONTH, HAVE YOU WISHED YOU WERE DEAD OR WISHED YOU COULD GO TO SLEEP AND NOT WAKE UP?: NO
2. HAVE YOU ACTUALLY HAD ANY THOUGHTS OF KILLING YOURSELF IN THE PAST MONTH?: NO
6. HAVE YOU EVER DONE ANYTHING, STARTED TO DO ANYTHING, OR PREPARED TO DO ANYTHING TO END YOUR LIFE?: NO

## 2025-08-27 ENCOUNTER — APPOINTMENT (OUTPATIENT)
Dept: CT IMAGING | Facility: CLINIC | Age: 89
DRG: 871 | End: 2025-08-27
Payer: MEDICARE

## 2025-08-27 PROCEDURE — 71250 CT THORAX DX C-: CPT

## 2025-08-27 ASSESSMENT — ACTIVITIES OF DAILY LIVING (ADL)
ADLS_ACUITY_SCORE: 30
DIFFICULTY_COMMUNICATING: NO
ADLS_ACUITY_SCORE: 42
ADLS_ACUITY_SCORE: 42
DOING_ERRANDS_INDEPENDENTLY_DIFFICULTY: YES
ADLS_ACUITY_SCORE: 39
ADLS_ACUITY_SCORE: 42
ADLS_ACUITY_SCORE: 30
ADLS_ACUITY_SCORE: 47
WEAR_GLASSES_OR_BLIND: YES
CHANGE_IN_FUNCTIONAL_STATUS_SINCE_ONSET_OF_CURRENT_ILLNESS/INJURY: NO
ADLS_ACUITY_SCORE: 47
WALKING_OR_CLIMBING_STAIRS_DIFFICULTY: YES
ADLS_ACUITY_SCORE: 47
HEARING_DIFFICULTY_OR_DEAF: NO
FALL_HISTORY_WITHIN_LAST_SIX_MONTHS: NO
ADLS_ACUITY_SCORE: 42
ADLS_ACUITY_SCORE: 47
ADLS_ACUITY_SCORE: 47
WALKING_OR_CLIMBING_STAIRS: AMBULATION DIFFICULTY, ASSISTANCE 1 PERSON
ADLS_ACUITY_SCORE: 42
ADLS_ACUITY_SCORE: 42
DRESSING/BATHING_DIFFICULTY: NO
ADLS_ACUITY_SCORE: 42
TOILETING_ISSUES: NO
EQUIPMENT_CURRENTLY_USED_AT_HOME: WALKER, ROLLING
ADLS_ACUITY_SCORE: 47
ADLS_ACUITY_SCORE: 42
DIFFICULTY_EATING/SWALLOWING: NO
ADLS_ACUITY_SCORE: 42
ADLS_ACUITY_SCORE: 47
VISION_MANAGEMENT: PRESCRIPTION
ADLS_ACUITY_SCORE: 42
ADLS_ACUITY_SCORE: 42
ADLS_ACUITY_SCORE: 47
ADLS_ACUITY_SCORE: 42
CONCENTRATING,_REMEMBERING_OR_MAKING_DECISIONS_DIFFICULTY: NO

## 2025-08-28 PROBLEM — R41.0 DELIRIUM: Status: ACTIVE | Noted: 2025-08-28

## 2025-08-28 ASSESSMENT — ACTIVITIES OF DAILY LIVING (ADL)
ADLS_ACUITY_SCORE: 55
ADLS_ACUITY_SCORE: 42
ADLS_ACUITY_SCORE: 55
DEPENDENT_IADLS:: CLEANING;COOKING;LAUNDRY;SHOPPING;MEAL PREPARATION;MEDICATION MANAGEMENT;TRANSPORTATION
ADLS_ACUITY_SCORE: 55
ADLS_ACUITY_SCORE: 42
ADLS_ACUITY_SCORE: 42
ADLS_ACUITY_SCORE: 55
ADLS_ACUITY_SCORE: 42
ADLS_ACUITY_SCORE: 55
ADLS_ACUITY_SCORE: 42
ADLS_ACUITY_SCORE: 42
ADLS_ACUITY_SCORE: 55
ADLS_ACUITY_SCORE: 42
ADLS_ACUITY_SCORE: 55
ADLS_ACUITY_SCORE: 55
ADLS_ACUITY_SCORE: 42
ADLS_ACUITY_SCORE: 55
ADLS_ACUITY_SCORE: 42

## 2025-08-29 ENCOUNTER — APPOINTMENT (OUTPATIENT)
Dept: PHYSICAL THERAPY | Facility: CLINIC | Age: 89
DRG: 871 | End: 2025-08-29
Attending: HOSPITALIST
Payer: MEDICARE

## 2025-08-29 ASSESSMENT — ACTIVITIES OF DAILY LIVING (ADL)
ADLS_ACUITY_SCORE: 60
ADLS_ACUITY_SCORE: 59
ADLS_ACUITY_SCORE: 59
ADLS_ACUITY_SCORE: 60
ADLS_ACUITY_SCORE: 59
ADLS_ACUITY_SCORE: 60
ADLS_ACUITY_SCORE: 59
ADLS_ACUITY_SCORE: 60
ADLS_ACUITY_SCORE: 59
ADLS_ACUITY_SCORE: 59
ADLS_ACUITY_SCORE: 60
ADLS_ACUITY_SCORE: 59
ADLS_ACUITY_SCORE: 59
ADLS_ACUITY_SCORE: 60
ADLS_ACUITY_SCORE: 59
ADLS_ACUITY_SCORE: 60
ADLS_ACUITY_SCORE: 56

## 2025-08-30 ENCOUNTER — APPOINTMENT (OUTPATIENT)
Dept: GENERAL RADIOLOGY | Facility: CLINIC | Age: 89
DRG: 871 | End: 2025-08-30
Payer: MEDICARE

## 2025-08-30 PROCEDURE — 71045 X-RAY EXAM CHEST 1 VIEW: CPT

## 2025-08-30 ASSESSMENT — ACTIVITIES OF DAILY LIVING (ADL)
ADLS_ACUITY_SCORE: 64
ADLS_ACUITY_SCORE: 68
ADLS_ACUITY_SCORE: 56
ADLS_ACUITY_SCORE: 64
ADLS_ACUITY_SCORE: 64
ADLS_ACUITY_SCORE: 62
ADLS_ACUITY_SCORE: 64
ADLS_ACUITY_SCORE: 64
ADLS_ACUITY_SCORE: 56
ADLS_ACUITY_SCORE: 68
ADLS_ACUITY_SCORE: 56
ADLS_ACUITY_SCORE: 62
ADLS_ACUITY_SCORE: 56
ADLS_ACUITY_SCORE: 56
ADLS_ACUITY_SCORE: 68
ADLS_ACUITY_SCORE: 56
ADLS_ACUITY_SCORE: 62
ADLS_ACUITY_SCORE: 64
ADLS_ACUITY_SCORE: 56

## 2025-08-31 ENCOUNTER — APPOINTMENT (OUTPATIENT)
Dept: ULTRASOUND IMAGING | Facility: CLINIC | Age: 89
DRG: 871 | End: 2025-08-31
Payer: MEDICARE

## 2025-08-31 PROCEDURE — 93971 EXTREMITY STUDY: CPT | Mod: LT

## 2025-08-31 ASSESSMENT — ACTIVITIES OF DAILY LIVING (ADL)
ADLS_ACUITY_SCORE: 66
ADLS_ACUITY_SCORE: 66
ADLS_ACUITY_SCORE: 64
ADLS_ACUITY_SCORE: 66
ADLS_ACUITY_SCORE: 64
ADLS_ACUITY_SCORE: 62
ADLS_ACUITY_SCORE: 66
ADLS_ACUITY_SCORE: 68
ADLS_ACUITY_SCORE: 66
ADLS_ACUITY_SCORE: 66
ADLS_ACUITY_SCORE: 62
ADLS_ACUITY_SCORE: 64
ADLS_ACUITY_SCORE: 66

## 2025-09-01 ASSESSMENT — ACTIVITIES OF DAILY LIVING (ADL)
ADLS_ACUITY_SCORE: 66
ADLS_ACUITY_SCORE: 63
ADLS_ACUITY_SCORE: 66
ADLS_ACUITY_SCORE: 63
ADLS_ACUITY_SCORE: 66
ADLS_ACUITY_SCORE: 66
ADLS_ACUITY_SCORE: 63
ADLS_ACUITY_SCORE: 66
ADLS_ACUITY_SCORE: 66
ADLS_ACUITY_SCORE: 63
ADLS_ACUITY_SCORE: 64
ADLS_ACUITY_SCORE: 66
ADLS_ACUITY_SCORE: 64
ADLS_ACUITY_SCORE: 66
ADLS_ACUITY_SCORE: 63
ADLS_ACUITY_SCORE: 63
ADLS_ACUITY_SCORE: 66
ADLS_ACUITY_SCORE: 64
ADLS_ACUITY_SCORE: 63

## 2025-09-02 ASSESSMENT — ACTIVITIES OF DAILY LIVING (ADL)
ADLS_ACUITY_SCORE: 64
ADLS_ACUITY_SCORE: 63
ADLS_ACUITY_SCORE: 64

## 2025-09-03 ASSESSMENT — ACTIVITIES OF DAILY LIVING (ADL)
ADLS_ACUITY_SCORE: 63
ADLS_ACUITY_SCORE: 61
ADLS_ACUITY_SCORE: 63

## 2025-09-04 ENCOUNTER — ASSISTED LIVING VISIT (OUTPATIENT)
Dept: GERIATRICS | Facility: CLINIC | Age: 89
End: 2025-09-04
Payer: MEDICARE

## 2025-09-04 VITALS
DIASTOLIC BLOOD PRESSURE: 56 MMHG | HEIGHT: 64 IN | HEART RATE: 84 BPM | SYSTOLIC BLOOD PRESSURE: 90 MMHG | BODY MASS INDEX: 24.22 KG/M2 | RESPIRATION RATE: 18 BRPM | OXYGEN SATURATION: 94 %

## 2025-09-04 DIAGNOSIS — G60.9 IDIOPATHIC PERIPHERAL NEUROPATHY: ICD-10-CM

## 2025-09-04 DIAGNOSIS — N39.0 SEPSIS DUE TO URINARY TRACT INFECTION (H): Primary | ICD-10-CM

## 2025-09-04 DIAGNOSIS — A41.9 SEPSIS DUE TO URINARY TRACT INFECTION (H): Primary | ICD-10-CM

## 2025-09-04 DIAGNOSIS — I95.9 HYPOTENSION, UNSPECIFIED HYPOTENSION TYPE: ICD-10-CM
